# Patient Record
Sex: FEMALE | Race: WHITE
[De-identification: names, ages, dates, MRNs, and addresses within clinical notes are randomized per-mention and may not be internally consistent; named-entity substitution may affect disease eponyms.]

---

## 2018-12-26 ENCOUNTER — HOSPITAL ENCOUNTER (OUTPATIENT)
Dept: HOSPITAL 47 - OR | Age: 23
Discharge: HOME | End: 2018-12-26
Attending: OTOLARYNGOLOGY
Payer: MEDICAID

## 2018-12-26 VITALS — RESPIRATION RATE: 16 BRPM

## 2018-12-26 VITALS — TEMPERATURE: 98 F

## 2018-12-26 VITALS — DIASTOLIC BLOOD PRESSURE: 63 MMHG | HEART RATE: 73 BPM | SYSTOLIC BLOOD PRESSURE: 97 MMHG

## 2018-12-26 VITALS — BODY MASS INDEX: 20.7 KG/M2

## 2018-12-26 DIAGNOSIS — J35.8: ICD-10-CM

## 2018-12-26 DIAGNOSIS — Z91.018: ICD-10-CM

## 2018-12-26 DIAGNOSIS — F32.9: ICD-10-CM

## 2018-12-26 DIAGNOSIS — J02.9: ICD-10-CM

## 2018-12-26 DIAGNOSIS — F41.9: ICD-10-CM

## 2018-12-26 DIAGNOSIS — J35.01: Primary | ICD-10-CM

## 2018-12-26 DIAGNOSIS — Z79.899: ICD-10-CM

## 2018-12-26 PROCEDURE — 88304 TISSUE EXAM BY PATHOLOGIST: CPT

## 2018-12-26 PROCEDURE — 42826 REMOVAL OF TONSILS: CPT

## 2018-12-26 PROCEDURE — 81025 URINE PREGNANCY TEST: CPT

## 2018-12-26 NOTE — P.OP
Date of Procedure: 12/26/18


Preoperative Diagnosis: 


Chronic tonsillitis


Postoperative Diagnosis: 


Same


Procedure(s) Performed: 


Tonsillectomy


Anesthesia: DORETHA


Surgeon: Dion Solis


Estimated Blood Loss (ml): 2


Pathology: other (Bilateral tonsils)


Condition: stable


Disposition: PACU


Indications for Procedure: 


This 23-year-old white female whose had difficulties with chronic and recurrent 

tonsillitis


Operative Findings: 


Tonsils +3 bilaterally-they are cryptic with debris in the crypts


Description of Procedure: 


Patient was brought in the operative suite and placed in a supine position.  

Patient underwent induction of general anesthesia with oral endotracheal 

intubation without difficulty.  The patient prepped and draped using aseptic 

fashion.  The McIvor mouth gag was placed.  Soft palate palpated and no 

submucous cleft was noted.  Nasopharynx examined with a mirror exam and there 

was no significant adenoid tissue noted.  The left tonsil was grasped with a 

curved Allis clamp and dissected from tonsillar fossa in a superior to inferior 

direction using both blunt and electrocautery dissection until tonsils removed.

  Once tonsils removed hemostasis gained with suction cautery.  Attention was 

then turned to the right where the right tonsil was removed exactly as the left 

had been.  Once this tonsils removed hemostasis gained with suction cautery.  

This hemostasis was obtained and remained good in both tonsillar fossa the 

patient was suctioned in oral gastric fashion the McIvor mouth gag was removed.

  Patient allowed to emerge from general anesthesia and having tolerated the 

procedure well was extubated in the operating suite and transferred to postop 

recovery area in satisfactory condition.